# Patient Record
Sex: MALE | Race: BLACK OR AFRICAN AMERICAN | URBAN - METROPOLITAN AREA
[De-identification: names, ages, dates, MRNs, and addresses within clinical notes are randomized per-mention and may not be internally consistent; named-entity substitution may affect disease eponyms.]

---

## 2017-02-03 ENCOUNTER — OFFICE VISIT (OUTPATIENT)
Dept: FAMILY MEDICINE | Facility: CLINIC | Age: 42
End: 2017-02-03

## 2017-02-03 VITALS
BODY MASS INDEX: 25.34 KG/M2 | OXYGEN SATURATION: 100 % | HEART RATE: 86 BPM | RESPIRATION RATE: 18 BRPM | WEIGHT: 167.2 LBS | SYSTOLIC BLOOD PRESSURE: 145 MMHG | TEMPERATURE: 98.2 F | HEIGHT: 68 IN | DIASTOLIC BLOOD PRESSURE: 93 MMHG

## 2017-02-03 DIAGNOSIS — K02.9 DENTAL CARIES: ICD-10-CM

## 2017-02-03 DIAGNOSIS — Z00.00 ENCOUNTER FOR ROUTINE ADULT HEALTH EXAMINATION WITHOUT ABNORMAL FINDINGS: Primary | ICD-10-CM

## 2017-02-03 DIAGNOSIS — F17.200 SMOKER: ICD-10-CM

## 2017-02-03 LAB
CHOLEST SERPL-MCNC: 185.1 MG/DL (ref 0–200)
CHOLEST/HDLC SERPL: 4.2 {RATIO} (ref 0–5)
GLUCOSE CASUAL: 111 MG/DL (ref 51–200)
HDLC SERPL-MCNC: 43.8 MG/DL
LDLC SERPL CALC-MCNC: 121 MG/DL (ref 0–129)
TRIGL SERPL-MCNC: 103 MG/DL (ref 0–150)
VLDL CHOLESTEROL: 20.6 MG/DL (ref 7–32)

## 2017-02-03 NOTE — PROGRESS NOTES
Male Physical Note          HPI         Concerns today: No special concerns today.          History reviewed. No pertinent past medical history.  All - neg  Med Prob - neg  Surg - neg  Fx - neg  Hospitalizations - neg       Family History   Problem Relation Age of Onset     DIABETES No family hx of      Coronary Artery Disease No family hx of      Hypertension No family hx of      Hyperlipidemia No family hx of      Other Cancer No family hx of               Review of Systems:     Review of Systems:  CONSTITUTIONAL: NEGATIVE for fever, chills, change in weight  INTEGUMENTARY/SKIN: NEGATIVE for worrisome rashes, moles or lesions  EYES: NEGATIVE for vision changes or irritation  ENT/MOUTH: NEGATIVE for ear, mouth and throat problems  RESP: NEGATIVE for significant cough or SOB  BREAST: NEGATIVE for masses, tenderness or discharge  CV: NEGATIVE for chest pain, palpitations or peripheral edema  GI: NEGATIVE for nausea, abdominal pain, heartburn, or change in bowel habits  : NEGATIVE for frequency, dysuria, or hematuria  MUSCULOSKELETAL: NEGATIVE for significant arthralgias or myalgia  NEURO: NEGATIVE for weakness, dizziness or paresthesias  ENDOCRINE: NEGATIVE for temperature intolerance, skin/hair changes  HEME/ALLERGY: NEGATIVE for bleeding problems  PSYCHIATRIC: NEGATIVE for changes in mood or affect  Sleep:   Do you snore most or the night (as reported by a family member)? No  Do you feel sleepy or extremely tired during most of the day? No             Social History     Social History     Social History     Marital Status: Single     Spouse Name: N/A     Number of Children: N/A     Years of Education: N/A     Occupational History     Not on file.     Social History Main Topics     Smoking status: Light Tobacco Smoker     Types: Cigarettes     Smokeless tobacco: Not on file     Alcohol Use: No     Drug Use: No     Sexual Activity: Not Currently     Other Topics Concern     Not on file     Social History  "Narrative       Marital Status:Single  Who lives in your household? alone    Has anyone hurt you physically, for example by pushing, hitting, slapping or kicking you or forcing you to have sex? Denies  Do you feel threatened or controlled by a partner, ex-partner or anyone in your life? Denies    Sexual Health     Sexual concerns: No   STI History: Neg      Recommended Screening     Cholesterol Level (>44 yo or at risk):  done  Immunization - recent injects with [employer] evaluation             Physical Exam:     Vitals: /93 mmHg  Pulse 86  Temp(Src) 98.2  F (36.8  C) (Oral)  Resp 18  Ht 5' 8.4\" (173.7 cm)  Wt 167 lb 3.2 oz (75.841 kg)  BMI 25.14 kg/m2  SpO2 100%  BMI= Body mass index is 25.14 kg/(m^2).  GENERAL: healthy, alert and no distress  EYES: Eyes grossly normal to inspection, extraocular movements - intact, and PERRL  HENT: ear canals- normal; TMs- normal; Nose- normal; Mouth- no ulcers, no lesions  NECK: no tenderness, no adenopathy, no asymmetry, no masses, no stiffness; thyroid- normal to palpation  RESP: lungs clear to auscultation - no rales, no rhonchi, no wheezes  BREAST: no masses, no tenderness, no nipple discharge, no palpable axillary masses or adenopathy  CV: regular rates and rhythm, normal S1 S2, no S3 or S4 and no murmur, no click or rub -  ABDOMEN: soft, no tenderness, no  hepatosplenomegaly, no masses, normal bowel sounds  MS: extremities- no gross deformities noted, no edema  SKIN: no suspicious lesions, no rashes  NEURO: strength and tone- normal, sensory exam- grossly normal, mentation- intact, speech- normal, reflexes- symmetric  BACK: no CVA tenderness, no paralumbar tenderness  - male: testicles- normal, no atrophy, no masses;  no inguinal hernias  PSYCH: Alert and oriented times 3; speech- coherent , normal rate and volume; able to articulate logical thoughts, able to abstract reason, no tangential thoughts, no hallucinations or delusions, affect- normal  LYMPHATICS: " ant. cervical- normal, post. cervical- normal, axillary- normal, supraclavicular- normal, inguinal- normal      LAB  Coffee and water this AM    Results for orders placed or performed in visit on 02/03/17   Lipid Cascade ([facility])   Result Value Ref Range    Cholesterol 185.1 0.0 - 200.0 mg/dL    Cholesterol/HDL Ratio 4.2 0.0 - 5.0    HDL Cholesterol 43.8 >40.0 mg/dL    LDL Cholesterol Calculated 121 0 - 129 mg/dL    Triglycerides 103.0 0.0 - 150.0 mg/dL    VLDL Cholesterol 20.6 7.0 - 32.0 mg/dL   Glucose Casual ([facility])   Result Value Ref Range    Glucose Casual 111.0 51.0 - 200.0 mg/dL         Assessment and Plan     1. Encounter for routine adult health examination without abnormal findings  Blood work  send the result in the mail    - Lipid Cascade ([facility])  - Glucose Casual ([facility])    2. Smoker  Discussed cessation    3. Dental caries  Make arrangement for cleanings    4. Elevated BP  Recheck next year        Options for treatment and follow-up care were reviewed with the patient. Fady Shannon and/or guardian engaged in the decision making process and verbalized understanding of the options discussed and agreed with the final plan.    Yefri Cabral MD

## 2017-02-03 NOTE — PATIENT INSTRUCTIONS
1. Encounter for routine adult health examination without abnormal findings  Blood work  I will send the result in the mail    - Lipid Cascade ([facility])  - Glucose Casual ([facility])    2. Smoker  Time to stop    3. Dental caries  Make arrangement for your cleanings    PHarper              Preventive Health Recommendations  Male Ages 40 to 49    Yearly exam:             See your health care provider every year in order to  o   Review health changes.   o   Discuss preventive care.    o   Review your medicines if your doctor has prescribed any.    You should be tested each year for STDs (sexually transmitted diseases) if you re at risk.     Have a cholesterol test every 5 years.     Have a colonoscopy (test for colon cancer) if someone in your family has had colon cancer or polyps before age 50.     After age 45, have a diabetes test (fasting glucose). If you are at risk for diabetes, you should have this test every 3 years.      Talk with your health care provider about whether or not a prostate cancer screening test (PSA) is right for you.    Shots: Get a flu shot each year. Get a tetanus shot every 10 years.     Nutrition:    Eat at least 5 servings of fruits and vegetables daily.     Eat whole-grain bread, whole-wheat pasta and brown rice instead of white grains and rice.     Talk to your provider about Calcium and Vitamin D.     Lifestyle    Exercise for at least 150 minutes a week (30 minutes a day, 5 days a week). This will help you control your weight and prevent disease.     Limit alcohol to one drink per day.     No smoking.     Wear sunscreen to prevent skin cancer.     See your dentist every six months for an exam and cleaning.

## 2017-02-03 NOTE — MR AVS SNAPSHOT
After Visit Summary   2/3/2017    Fady Shannon    MRN: [id number]           Patient Information     Date Of Birth          1975        Visit Information        Provider Department      2/3/2017 9:00 AM Yefri Cabral MD [facility]        Today's Diagnoses     Encounter for routine adult health examination without abnormal findings    -  1     Smoker         Dental caries           Care Instructions    1. Encounter for routine adult health examination without abnormal findings  Blood work  I will send the result in the mail    - Lipid Cascade ([facility])  - Glucose Casual ([facility])    2. Smoker  Time to stop    3. Dental caries  Make arrangement for your cleanings    PHarper              Preventive Health Recommendations  Male Ages 40 to 49    Yearly exam:             See your health care provider every year in order to  o   Review health changes.   o   Discuss preventive care.    o   Review your medicines if your doctor has prescribed any.    You should be tested each year for STDs (sexually transmitted diseases) if you re at risk.     Have a cholesterol test every 5 years.     Have a colonoscopy (test for colon cancer) if someone in your family has had colon cancer or polyps before age 50.     After age 45, have a diabetes test (fasting glucose). If you are at risk for diabetes, you should have this test every 3 years.      Talk with your health care provider about whether or not a prostate cancer screening test (PSA) is right for you.    Shots: Get a flu shot each year. Get a tetanus shot every 10 years.     Nutrition:    Eat at least 5 servings of fruits and vegetables daily.     Eat whole-grain bread, whole-wheat pasta and brown rice instead of white grains and rice.     Talk to your provider about Calcium and Vitamin D.     Lifestyle    Exercise for at least 150 minutes a week (30 minutes a day, 5 days a week). This will help you control your weight and prevent  "disease.     Limit alcohol to one drink per day.     No smoking.     Wear sunscreen to prevent skin cancer.     See your dentist every six months for an exam and cleaning.            Follow-ups after your visit        Who to contact     Please call your clinic at [phone] to:    Ask questions about your health    Make or cancel appointments    Discuss your medicines    Learn about your test results    Speak to your doctor   If you have compliments or concerns about an experience at your clinic, or if you wish to file a complaint, please contact [facility] Patient Relations at [phone] or email us at [email or web address]         Additional Information About Your Visit        ActivityHeroharFlipKey Information     [employer] is an electronic gateway that provides easy, online access to your medical records. With [employer], you can request a clinic appointment, read your test results, renew a prescription or communicate with your care team.     To sign up for [employer] visit the website at [email or web address]   You will be asked to enter the access code listed below, as well as some personal information. Please follow the directions to create your username and password.     Your access code is: [email or web address]  Expires: 2017  9:53 AM     Your access code will  in 90 days. If you need help or a new code, please contact your [facility] Clinic or call [phone] for assistance.        Care EveryWhere ID     This is your Care EveryWhere ID. This could be used by other organizations to access your [facility] medical records  [id number]        Your Vitals Were     Pulse Temperature Respirations Height BMI (Body Mass Index) Pulse Oximetry    86 98.2  F (36.8  C) (Oral) 18 5' 8.4\" (173.7 cm) 25.14 kg/m2 100%       Blood Pressure from Last 3 Encounters:   17 145/93   12/13/15 136/84    Weight from Last 3 Encounters:   17 167 lb 3.2 oz (75.841 kg)    "           We Performed the Following     Glucose Casual ([facility])     Lipid Cascade ([facility])        Primary Care Provider    Physician No Ref-Primary       No address on file        Thank you!     Thank you for choosing [facility]  for your care. Our goal is always to provide you with excellent care. Hearing back from our patients is one way we can continue to improve our services. Please take a few minutes to complete the written survey that you may receive in the mail after your visit with us. Thank you!             Your Updated Medication List - Protect others around you: Learn how to safely use, store and throw away your medicines at www.disposemymeds.org.      Notice  As of 2/3/2017  9:55 AM    You have not been prescribed any medications.

## 2017-02-03 NOTE — Clinical Note
February 3, 2017      Fady Shannon  [address]    [place] [address]        Dear Fady,    Thank you for getting your care at [facility]. Please see below for your test results.  All your labs are normal  Good news    Resulted Orders   Lipid Cascade ([facility])   Result Value Ref Range    Cholesterol 185.1 0.0 - 200.0 mg/dL    Cholesterol/HDL Ratio 4.2 0.0 - 5.0    HDL Cholesterol 43.8 >40.0 mg/dL    LDL Cholesterol Calculated 121 0 - 129 mg/dL    Triglycerides 103.0 0.0 - 150.0 mg/dL    VLDL Cholesterol 20.6 7.0 - 32.0 mg/dL   Glucose Casual ([facility])   Result Value Ref Range    Glucose Casual 111.0 51.0 - 200.0 mg/dL     Sincerely,    Yefri Cabral MD

## 2017-02-13 ENCOUNTER — TELEPHONE (OUTPATIENT)
Dept: FAMILY MEDICINE | Facility: CLINIC | Age: 42
End: 2017-02-13

## 2017-02-13 NOTE — TELEPHONE ENCOUNTER
[facility] phone call message- patient requesting results:    Test: Lab    Date of test: 02/03/17    Additional Comments: I relayed the message regarding his lab results in the letter. However, patient would like his letter resent as he did not receive it. I did confirm patient's address in chart and updated as necessary.     OK to leave a message on voice mail? Yes    Primary language: English      needed? No    Call taken on February 13, 2017 at 11:10 AM by Avelina Arenas

## 2017-04-11 ENCOUNTER — APPOINTMENT (OUTPATIENT)
Dept: GENERAL RADIOLOGY | Facility: CLINIC | Age: 42
End: 2017-04-11
Attending: EMERGENCY MEDICINE
Payer: COMMERCIAL

## 2017-04-11 ENCOUNTER — HOSPITAL ENCOUNTER (EMERGENCY)
Facility: CLINIC | Age: 42
Discharge: HOME | End: 2017-04-11
Attending: EMERGENCY MEDICINE | Admitting: EMERGENCY MEDICINE
Payer: COMMERCIAL

## 2017-04-11 VITALS
TEMPERATURE: 98.5 F | WEIGHT: 164.6 LBS | DIASTOLIC BLOOD PRESSURE: 92 MMHG | SYSTOLIC BLOOD PRESSURE: 136 MMHG | RESPIRATION RATE: 16 BRPM | HEART RATE: 67 BPM | BODY MASS INDEX: 24.74 KG/M2 | OXYGEN SATURATION: 98 %

## 2017-04-11 DIAGNOSIS — S60.131A CONTUSION OF RIGHT MIDDLE FINGER WITH DAMAGE TO NAIL, INITIAL ENCOUNTER: ICD-10-CM

## 2017-04-11 DIAGNOSIS — S60.10XA HEMATOMA, SUBUNGUAL, FINGER, RIGHT, INITIAL ENCOUNTER: ICD-10-CM
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
## 2017-04-11 PROCEDURE — 73140 X-RAY EXAM OF FINGER(S): CPT | Mod: RT

## 2017-04-11 PROCEDURE — 25000132 ZZH RX MED GY IP 250 OP 250 PS 637: Performed by: EMERGENCY MEDICINE

## 2017-04-11 PROCEDURE — 11740 EVACUATION SUBUNGUAL HMTMA: CPT | Mod: F7 | Performed by: EMERGENCY MEDICINE

## 2017-04-11 PROCEDURE — 99284 EMERGENCY DEPT VISIT MOD MDM: CPT | Mod: 25 | Performed by: EMERGENCY MEDICINE

## 2017-04-11 PROCEDURE — 99283 EMERGENCY DEPT VISIT LOW MDM: CPT | Mod: 25 | Performed by: EMERGENCY MEDICINE

## 2017-04-11 RX ORDER — IBUPROFEN 600 MG/1
600 TABLET, FILM COATED ORAL EVERY 6 HOURS PRN
Qty: 30 TABLET | Refills: 0 | Status: SHIPPED | OUTPATIENT
Start: 2017-04-11

## 2017-04-11 RX ORDER — IBUPROFEN 600 MG/1
600 TABLET, FILM COATED ORAL ONCE
Status: COMPLETED | OUTPATIENT
Start: 2017-04-11 | End: 2017-04-11

## 2017-04-11 RX ADMIN — IBUPROFEN 600 MG: 600 TABLET ORAL at 22:38

## 2017-04-11 ASSESSMENT — ENCOUNTER SYMPTOMS
FEVER: 0
NUMBNESS: 0
NAUSEA: 0
VOMITING: 0
WEAKNESS: 0

## 2017-04-11 NOTE — ED AVS SNAPSHOT
[facility], Emergency Department    [address]    [place] [address]    Phone:  [phone]    Fax:  [phone]                                       Fady Shannon   MRN: [id number]    Department:  [facility], Emergency Department   Date of Visit:  4/11/2017           After Visit Summary Signature Page     I have received my discharge instructions, and my questions have been answered. I have discussed any challenges I see with this plan with the nurse or doctor.    ..........................................................................................................................................  Patient/Patient Representative Signature      ..........................................................................................................................................  Patient Representative Print Name and Relationship to Patient    ..................................................               ................................................  Date                                            Time    ..........................................................................................................................................  Reviewed by Signature/Title    ...................................................              ..............................................  Date                                                            Time

## 2017-04-11 NOTE — ED AVS SNAPSHOT
[facility], Emergency Department    [address]    [place] [address]    Phone:  [phone]    Fax:  [phone]                                       Fady Shannon   MRN: [id number]    Department:  [facility], Emergency Department   Date of Visit:  4/11/2017           Patient Information     Date Of Birth          1975        Your diagnoses for this visit were:     Hematoma, subungual, finger, right, initial encounter        You were seen by Anastacio Frost MD.        Discharge Instructions             Subungual Hematoma  A subungual hematoma is blood under the nail. It occurs when the finger or toe has been hit or crushed. It causes the nail to look blue. Sometimes, the bone under the nail may be fractured.    If the bruise is small and not too painful, it will heal without treatment. If the bruise is large and painful, the blood may need to be drained.  If a large area of the nail is damaged, your doctor may want to remove it. If the nail was not removed, it may become loose or fall off in the next 2 weeks. In almost all cases, the nail will grow back from the area under the cuticle called the matrix. This takes a few weeks to start and is complete in about 4 to 6 months for a fingernail and 12 months for a toenail. If the nail bed or matrix was damaged, the nail may grow back with a rough or irregular shape. Sometimes the nail may not regrow at all.  Home care  The following guidelines will help you care for your wound at home:    Apply an ice pack (ice cubes in a plastic bag, wrapped in a thin towel) for 20 minutes every 1 to 2 hours the first day. Continue this 3 to 4 times a day until the pain and swelling goes away.    If the nail was drained:    Keep the nail covered with a clean adhesive bandage for the next 2 days. There may be some oozing of blood during that time, so change the bandage as needed.    Soak the finger or toe once a day under warm running water. Clean any crust away  with a cotton-tipped applicator soaked in soapy water.    If the nail was removed:    The nail bed (tissue under the nail) is moist, soft and sensitive. This needs to be protected from injury for the first 7 to 10 days until it dries out and becomes hard. Keep it covered with a dressing or adhesive bandage until that time.    Bandages tend to stick to a newly exposed nail bed and can be hard to remove if left in place more than 24 hours. Therefore, unless you were told otherwise, change dressings every 24 hours. You can also use nonstick dressings. If necessary, soak the dressing off while holding your finger or toe under warm running water.    If an X-ray showed a fracture, you should protect the finger or toe for 3 to 4 weeks while it is healing.  Here is some information regarding medicine and your wound:    You can take acetaminophen or ibuprofen for pain, unless you were given a different pain medicine to use. If you have chronic liver or kidney disease or ever had a stomach ulcer or GI bleeding or are taking blood thinner medications, talk with your doctor before using these medicines.    If you were given antibiotics, take them until they are used up. It is important to finish the antibiotics even if the wound looks better. This will ensure the infection has cleared.  Follow-up care  Follow up with your doctor or this facility as advised. If the nail was drained, there is a small risk of infection. Watch carefully for the signs listed below.  When to get medical care  Get prompt medical attention if any of the following occur:    Increasing redness around the nail    Increasing local pain or swelling    Pus draining from the nail    Fever of 100.4 F (38 C) or higher, or as directed by your health care provider    9710-2801 [employer]. [address] [place] [address]. All rights reserved. This information is not intended as a substitute for professional medical care. Always follow your  "healthcare professional's instructions.      Protect finger nail from further injury.  Take ibuprofen as needed for pain.    Please make an appointment to follow up with Your Primary Care Provider in 1 week as needed.               24 Hour Appointment Hotline       To make an appointment at any [facility] clinic, call [phone] ([phone]). If you don't have a family doctor or clinic, we will help you find one. [place] [facility] clinics are conveniently located to serve the needs of you and your family.             Review of your medicines      START taking        Dose / Directions Last dose taken    ibuprofen 600 MG tablet   Commonly known as:  ADVIL/MOTRIN   Dose:  600 mg   Quantity:  30 tablet        Take 1 tablet (600 mg) by mouth every 6 hours as needed for moderate pain   Refills:  0                Prescriptions were sent or printed at these locations (1 Prescription)                   Other Prescriptions                Printed at Department/Unit printer (1 of 1)         ibuprofen (ADVIL/MOTRIN) 600 MG tablet                Procedures and tests performed during your visit     Fingers XR, 2-3 views, right      Orders Needing Specimen Collection     None      Pending Results     No orders found from 4/9/2017 to 4/12/2017.            Pending Culture Results     No orders found from 4/9/2017 to 4/12/2017.            Thank you for choosing [facility]       Thank you for choosing [facility] for your care. Our goal is always to provide you with excellent care. Hearing back from our patients is one way we can continue to improve our services. Please take a few minutes to complete the written survey that you may receive in the mail after you visit with us. Thank you!        ID8-Mobile[employer] Information     Vidly lets you send messages to your doctor, view your test results, renew your prescriptions, schedule appointments and more. To sign up, go to [email or web address] . Click on \"Log in\" on the left side of the screen, which " "will take you to the Welcome page. Then click on \"Sign up Now\" on the right side of the page.     You will be asked to enter the access code listed below, as well as some personal information. Please follow the directions to create your username and password.     Your access code is: [email or web address]  Expires: 2017 10:53 AM     Your access code will  in 90 days. If you need help or a new code, please call your [facility] or [phone].        Care EveryWhere ID     This is your Care EveryWhere ID. This could be used by other organizations to access your [place] medical records  [id number]        After Visit Summary       This is your record. Keep this with you and show to your community pharmacist(s) and doctor(s) at your next visit.                  "

## 2017-04-12 NOTE — DISCHARGE INSTRUCTIONS
Subungual Hematoma  A subungual hematoma is blood under the nail. It occurs when the finger or toe has been hit or crushed. It causes the nail to look blue. Sometimes, the bone under the nail may be fractured.    If the bruise is small and not too painful, it will heal without treatment. If the bruise is large and painful, the blood may need to be drained.  If a large area of the nail is damaged, your doctor may want to remove it. If the nail was not removed, it may become loose or fall off in the next 2 weeks. In almost all cases, the nail will grow back from the area under the cuticle called the matrix. This takes a few weeks to start and is complete in about 4 to 6 months for a fingernail and 12 months for a toenail. If the nail bed or matrix was damaged, the nail may grow back with a rough or irregular shape. Sometimes the nail may not regrow at all.  Home care  The following guidelines will help you care for your wound at home:    Apply an ice pack (ice cubes in a plastic bag, wrapped in a thin towel) for 20 minutes every 1 to 2 hours the first day. Continue this 3 to 4 times a day until the pain and swelling goes away.    If the nail was drained:    Keep the nail covered with a clean adhesive bandage for the next 2 days. There may be some oozing of blood during that time, so change the bandage as needed.    Soak the finger or toe once a day under warm running water. Clean any crust away with a cotton-tipped applicator soaked in soapy water.    If the nail was removed:    The nail bed (tissue under the nail) is moist, soft and sensitive. This needs to be protected from injury for the first 7 to 10 days until it dries out and becomes hard. Keep it covered with a dressing or adhesive bandage until that time.    Bandages tend to stick to a newly exposed nail bed and can be hard to remove if left in place more than 24 hours. Therefore, unless you were told otherwise, change dressings every 24 hours. You can  also use nonstick dressings. If necessary, soak the dressing off while holding your finger or toe under warm running water.    If an X-ray showed a fracture, you should protect the finger or toe for 3 to 4 weeks while it is healing.  Here is some information regarding medicine and your wound:    You can take acetaminophen or ibuprofen for pain, unless you were given a different pain medicine to use. If you have chronic liver or kidney disease or ever had a stomach ulcer or GI bleeding or are taking blood thinner medications, talk with your doctor before using these medicines.    If you were given antibiotics, take them until they are used up. It is important to finish the antibiotics even if the wound looks better. This will ensure the infection has cleared.  Follow-up care  Follow up with your doctor or this facility as advised. If the nail was drained, there is a small risk of infection. Watch carefully for the signs listed below.  When to get medical care  Get prompt medical attention if any of the following occur:    Increasing redness around the nail    Increasing local pain or swelling    Pus draining from the nail    Fever of 100.4 F (38 C) or higher, or as directed by your health care provider    9295-0917 [employer]. [address] [place] [address]. All rights reserved. This information is not intended as a substitute for professional medical care. Always follow your healthcare professional's instructions.      Protect finger nail from further injury.  Take ibuprofen as needed for pain.    Please make an appointment to follow up with Your Primary Care Provider in 1 week as needed.

## 2017-04-12 NOTE — ED PROVIDER NOTES
History     Chief Complaint   Patient presents with     Hand Pain     shut car door on right middle finger today at 1300; some bruising noted at base of nailbed and swelling in middle finger; iced finger but hasn't taken meds for pain     HPI  Fady Shannon is a 41 year old right-hand-dominant male who presents to the emergency department with right middle finger pain and swelling.  Patient states that he shut the car door on his middle finger approximately 8 hours prior to presentation in the emergency department.  He complains of pain and bruising underneath his nail bed.  He denies any laceration.  He denies injury elsewhere on the hand.  His tetanus immunization is up-to-date.    I have reviewed the Medications, Allergies, Past Medical and Surgical History, and Social History in the Epic system.    Review of Systems   Constitutional: Negative for fever.   Gastrointestinal: Negative for nausea and vomiting.   Musculoskeletal:        Right middle finger pain   Neurological: Negative for weakness and numbness.       Physical Exam   BP: (!) 136/92  Pulse: 67  Temp: 98.5  F (36.9  C)  Resp: 16  Weight: 74.7 kg (164 lb 9.6 oz)  SpO2: 98 %  Physical Exam   Constitutional: He appears well-developed and well-nourished.   Cardiovascular: Intact distal pulses.    Musculoskeletal: Normal range of motion.        Right hand: He exhibits normal range of motion, normal capillary refill and no deformity. Normal sensation noted. Normal strength noted.        Hands:  Neurological: He is alert. He has normal strength. No sensory deficit.   Skin: Skin is warm and dry.   Nursing note and vitals reviewed.      ED Course     ED Course     Procedures            Critical Care time:    Results for orders placed or performed during the hospital encounter of 04/11/17 (from the past 24 hour(s))   Fingers XR, 2-3 views, right    Narrative    FINGER(S) TWO-THREE VIEWS RIGHT  4/11/2017 9:53 PM     HISTORY: Pain, trauma.    COMPARISON:  None.    FINDINGS: There is no significant degenerative change. There is no  acute fracture or dislocation. There are no worrisome bony lesions.      Impression    IMPRESSION:  No acute osseous abnormality demonstrated.    PRACHI ROBBINS MD      Medications   ibuprofen (ADVIL/MOTRIN) tablet 600 mg (600 mg Oral Given 4/11/17 7848)      Informed consent.  Trephination of subungual hematoma performed by myself with cautery.  Large amount of dark red blood obtained.  Drainage subsequently stopped.  Symptoms relieved.    Assessments & Plan (with Medical Decision Making)   41 year old right-hand-dominant male with right 3rd finger sublingual hematoma after slamming his finger in a car door several hours prior to presentation in the emergency department.  The patient does not have a fracture on radiograph.  His subungual hematoma was trephinated with return of blood and relief of his symptoms.  Ibuprofen prescribed for pain.  Ice recommended.  Primary care follow-up in one week as needed.    I have reviewed the nursing notes.    I have reviewed the findings, diagnosis, plan and need for follow up with the patient.    Discharge Medication List as of 4/11/2017 10:46 PM      START taking these medications    Details   ibuprofen (ADVIL/MOTRIN) 600 MG tablet Take 1 tablet (600 mg) by mouth every 6 hours as needed for moderate pain, Disp-30 tablet, R-0, Local Print             Final diagnoses:   Hematoma, subungual, finger, right, initial encounter       4/11/2017   [facility], [place], EMERGENCY DEPARTMENT     Anastacio Frost MD  04/12/17 0002

## 2017-05-26 ENCOUNTER — OFFICE VISIT (OUTPATIENT)
Dept: FAMILY MEDICINE | Facility: CLINIC | Age: 42
End: 2017-05-26

## 2017-05-26 VITALS
DIASTOLIC BLOOD PRESSURE: 93 MMHG | TEMPERATURE: 98.6 F | HEART RATE: 85 BPM | BODY MASS INDEX: 24.98 KG/M2 | OXYGEN SATURATION: 100 % | RESPIRATION RATE: 20 BRPM | SYSTOLIC BLOOD PRESSURE: 143 MMHG | WEIGHT: 166.2 LBS

## 2017-05-26 DIAGNOSIS — L98.9 FACIAL SKIN LESION: Primary | ICD-10-CM

## 2017-05-26 NOTE — PROGRESS NOTES
"HPI  41 year old male here for evaluation \"acne\"    Longstanding lesions under both eyes  Now darkening  No other locations  No treatments in the past  Not itchy, painful         ROS  6 point ROS neg other than the symptoms noted above in the HPI.    MEDS  Current Outpatient Prescriptions   Medication     ibuprofen (ADVIL/MOTRIN) 600 MG tablet     No current facility-administered medications for this visit.          SOC      FHx  Family History   Problem Relation Age of Onset     DIABETES No family hx of      Coronary Artery Disease No family hx of      Hypertension No family hx of      Hyperlipidemia No family hx of      Other Cancer No family hx of        PHYSICAL EXAMINATION:  Vital signs: BP (!) 143/93  Pulse 85  Temp 98.6  F (37  C) (Oral)  Resp 20  Wt 166 lb 3.2 oz (75.4 kg)  SpO2 100%  BMI 24.98 kg/m2    GENERAL:: healthy, alert and no distress  SKIN: multiple flat well-circumscribed lesions under both eyes - 20-30 under each eye      LABS        ASSESSMENT/PLAN    1. Facial skin lesion  ?Sebaceous Hyperplasia    - DERMATOLOGY REFERRAL - INTERNAL    EMILY GRAY    "

## 2017-05-26 NOTE — PATIENT INSTRUCTIONS
Here is the plan from today's visit    1. Facial skin lesion  Expect a call to schedule an appontment with the skin specialist (dermatologist)    - DERMATOLOGY REFERRAL - INTERNAL    PHarper      Lab Testing:  **If you had lab testing today and your results are reassuring or normal they will be mailed to you or sent through Timeliner within 7 days.   **If the lab tests need quick action we will call you with the results.  The phone number we will call with results is # [phone] (home) . If this is not the best number please call our clinic and change the number.  Medication Refills:  If you need any refills please call your pharmacy and they will contact us.   If you need to  your refill at a new pharmacy, please contact the new pharmacy directly. The new pharmacy will help you get your medications transferred faster.   Scheduling:  If you have any concerns about today's visit or wish to schedule another appointment please call our office during normal business hours [phone] (8-5:00 M-F)  If a referral was made to a [facility] and you don't get a call from central scheduling please call [phone].  If a Mammogram was ordered for you at [facility] call [phone] to schedule or change your appointment.  If you had an XRay/CT/Ultrasound/MRI ordered the number is [phone] to schedule or change your radiology appointment.   Medical Concerns:  If you have urgent medical concerns please call [phone] at any time of the day.

## 2017-05-26 NOTE — MR AVS SNAPSHOT
After Visit Summary   5/26/2017    Fady Shannon    MRN: [id number]           Patient Information     Date Of Birth          1975        Visit Information        Provider Department      5/26/2017 10:00 AM Yefri Cabral MD [facility]        Today's Diagnoses     Facial skin lesion    -  1      Care Instructions    Here is the plan from today's visit    1. Facial skin lesion  Expect a call to schedule an appontment with the skin specialist (dermatologist)    - DERMATOLOGY REFERRAL - INTERNAL    PHarper      Lab Testing:  **If you had lab testing today and your results are reassuring or normal they will be mailed to you or sent through [email or web address] within 7 days.   **If the lab tests need quick action we will call you with the results.  The phone number we will call with results is # [phone] (home) . If this is not the best number please call our clinic and change the number.  Medication Refills:  If you need any refills please call your pharmacy and they will contact us.   If you need to  your refill at a new pharmacy, please contact the new pharmacy directly. The new pharmacy will help you get your medications transferred faster.   Scheduling:  If you have any concerns about today's visit or wish to schedule another appointment please call our office during normal business hours [phone] (8-5:00 M-F)  If a referral was made to a [facility] and you don't get a call from central scheduling please call [phone].  If a Mammogram was ordered for you at [facility] call [phone] to schedule or change your appointment.  If you had an XRay/CT/Ultrasound/MRI ordered the number is [phone] to schedule or change your radiology appointment.   Medical Concerns:  If you have urgent medical concerns please call [phone] at any time of the day.            Follow-ups after your visit        Additional Services     DERMATOLOGY  REFERRAL - INTERNAL       Your provider has referred you to: [facility]: Dermatology Clinic - [place] [phone]   [email or web address]    Please be aware that coverage of these services is subject to the terms and limitations of your health insurance plan.  Call member services at your health plan with any benefit or coverage questions.      Please bring the following with you to your appointment:    (1) Any X-Rays, CTs or MRIs which have been performed.  Contact the facility where they were done to arrange for  prior to your scheduled appointment.    (2) List of current medications  (3) This referral request   (4) Any documents/labs given to you for this referral                  Who to contact     Please call your clinic at [phone] to:    Ask questions about your health    Make or cancel appointments    Discuss your medicines    Learn about your test results    Speak to your doctor   If you have compliments or concerns about an experience at your clinic, or if you wish to file a complaint, please contact [facility] Patient Relations at [phone] or email us at [email or web address]         Additional Information About Your Visit        [employer] Information     [employer] is an electronic gateway that provides easy, online access to your medical records. With [employer], you can request a clinic appointment, read your test results, renew a prescription or communicate with your care team.     To sign up for [employer] visit the website at [email or web address]   You will be asked to enter the access code listed below, as well as some personal information. Please follow the directions to create your username and password.     Your access code is: [email or web address]  Expires: 2017 11:06 AM     Your access code will  in 90 days. If you need help or a new code, please contact your [facility] Clinic or call  [phone] for assistance.        Care EveryWhere ID     This is your Care EveryWhere ID. This could be used by other organizations to access your [place] medical records  [id number]        Your Vitals Were     Pulse Temperature Respirations Pulse Oximetry BMI (Body Mass Index)       85 98.6  F (37  C) (Oral) 20 100% 24.98 kg/m2        Blood Pressure from Last 3 Encounters:   05/26/17 (!) 143/93   04/11/17 (!) 136/92   02/03/17 (!) 145/93    Weight from Last 3 Encounters:   05/26/17 166 lb 3.2 oz (75.4 kg)   04/11/17 164 lb 9.6 oz (74.7 kg)   02/03/17 167 lb 3.2 oz (75.8 kg)              We Performed the Following     DERMATOLOGY REFERRAL - INTERNAL        Primary Care Provider Office Phone # Fax #    Yefri Cabral [phone]       [facility] [address] [address]        Thank you!     Thank you for choosing \[facility]\"" FAMILY MEDICINE [facility]  for your care. Our goal is always to provide you with excellent care. Hearing back from our patients is one way we can continue to improve our services. Please take a few minutes to complete the written survey that you may receive in the mail after your visit with us. Thank you!             Your Updated Medication List - Protect others around you: Learn how to safely use, store and throw away your medicines at www.disposemymeds.org.          This list is accurate as of: 5/26/17 11:06 AM.  Always use your most recent med list.                   Brand Name Dispense Instructions for use    ibuprofen 600 MG tablet    ADVIL/MOTRIN    30 tablet    Take 1 tablet (600 mg) by mouth every 6 hours as needed for moderate pain

## 2017-08-08 ENCOUNTER — OFFICE VISIT (OUTPATIENT)
Dept: DERMATOLOGY | Facility: CLINIC | Age: 42
End: 2017-08-08

## 2017-08-08 ENCOUNTER — TELEPHONE (OUTPATIENT)
Dept: DERMATOLOGY | Facility: CLINIC | Age: 42
End: 2017-08-08

## 2017-08-08 DIAGNOSIS — D23.9 SYRINGOMA: Primary | ICD-10-CM

## 2017-08-08 RX ORDER — TRETINOIN 0.25 MG/G
CREAM TOPICAL
Qty: 45 G | Refills: 3 | Status: SHIPPED | OUTPATIENT
Start: 2017-08-08 | End: 2017-08-08

## 2017-08-08 RX ORDER — TRETINOIN 0.25 MG/G
CREAM TOPICAL
Qty: 45 G | Refills: 3 | Status: SHIPPED | OUTPATIENT
Start: 2017-08-08

## 2017-08-08 NOTE — LETTER
8/8/2017       RE: Fady Shannon  [address]    [place] [address]     Dear Colleague,    Thank you for referring your patient, Fady Shannon, to the [facility] at [facility]. Please see a copy of my visit note below.    [facility] Dermatology Note      Dermatology Problem List:  1.Facial lesions     Encounter Date: Aug 8, 2017    CC:  Chief Complaint   Patient presents with     Derm Problem     Fady is here with concerns of lesions on his face, notes they have not changed and he has had them several years.         History of Present Illness:  Mr. Fady Shannon is a 41 year old male who presents in consultation from [facility]. He is her due to concerns for lesions on his face that he has had since 2002. They popped up and have not changed since then. They have not been painful, itched and generally don't bother him. He feels well and has been a pretty healthy lauren.      Past Medical History:   Patient Active Problem List   Diagnosis     Encounter for routine adult health examination without abnormal findings     Smoker     Dental caries     No past medical history on file.  No past surgical history on file.      Medications:  Current Outpatient Prescriptions   Medication Sig Dispense Refill     ibuprofen (ADVIL/MOTRIN) 600 MG tablet Take 1 tablet (600 mg) by mouth every 6 hours as needed for moderate pain 30 tablet 0     No Known Allergies      Review of Systems:  -Constitutional: The patient denies fatigue, fevers, chills, unintended weight loss, and night sweats.  -HEENT: Patient denies nonhealing oral sores.  -Skin: As above in HPI. No additional skin concerns.    Physical exam:  Vitals: There were no vitals taken for this visit.  GEN: This is a well developed, well-nourished male in no acute distress, in a pleasant mood.    SKIN: Focused examination of the face, neck and bilat UE was performed.  -Many clustered  periorbital hyperpigmented 2-5mm papules bilaterally  -No other lesions of concern on areas examined.     Impression/Plan:  1. 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 The benign nature was discussed. Patient requested some treatment options to get rid of his lesions. Options were discussed  Including topical tx or possibly ablative laser and patient decided to try tretinoin cream first to see if this shows any improvement.      Tretinoin 0.025% cream on face at night before bed    Can discuss additional mgmt options such as laser (? Possibly isotretinoin) at followup     CC Dr. Yefri Cabral on close of this encounter.  Follow-up in 3 months, earlier for new or changing lesions.     Staff Involved:  Scribed by Keith Louis, MS4 for Dr. Hirsch.      The medical student acted as a scribe with respect to this patient.  I have performed all the key elements of the history and physical.    Chele Hirsch MD  Dermatology Attending                      Again, thank you for allowing me to participate in the care of your patient.      Sincerely,    Chele Hirsch MD

## 2017-08-08 NOTE — MR AVS SNAPSHOT
After Visit Summary   2017    Fady Shannon    MRN: [id number]           Patient Information     Date Of Birth          1975        Visit Information        Provider Department      2017 11:30 AM Chele Hirsch MD [facility] Dermatology        Today's Diagnoses     Syringoma    -  1       Follow-ups after your visit        Your next 10 appointments already scheduled     2017 12:45 PM CST   (Arrive by 12:30 PM)   Return Visit with MD JOSE LUIS Mcpherson [facility] Dermatology ([facility] [place])    [address] [place] [address] [phone]              Who to contact     Please call your clinic at [phone] to:    Ask questions about your health    Make or cancel appointments    Discuss your medicines    Learn about your test results    Speak to your doctor   If you have compliments or concerns about an experience at your clinic, or if you wish to file a complaint, please contact [facility] Patient Relations at [phone] or email us at [email or web address]         Additional Information About Your Visit        MyChart Information     [employer] is an electronic gateway that provides easy, online access to your medical records. With [employer], you can request a clinic appointment, read your test results, renew a prescription or communicate with your care team.     To sign up for [employer] visit the website at [email or web address]   You will be asked to enter the access code listed below, as well as some personal information. Please follow the directions to create your username and password.     Your access code is: [email or web address]  Expires: 2017 11:06 AM     Your access code will  in 90 days. If you need help or a new code, please contact your [facility] Clinic or call [phone] for assistance.        Care EveryWhere ID     This is your Care EveryWhere ID.  This could be used by other organizations to access your [place] medical records  [id number]         Blood Pressure from Last 3 Encounters:   05/26/17 (!) 143/93   04/11/17 (!) 136/92   02/03/17 (!) 145/93    Weight from Last 3 Encounters:   05/26/17 75.4 kg (166 lb 3.2 oz)   04/11/17 74.7 kg (164 lb 9.6 oz)   02/03/17 75.8 kg (167 lb 3.2 oz)              Today, you had the following     No orders found for display         Today's Medication Changes          These changes are accurate as of: 8/8/17  1:04 PM.  If you have any questions, ask your nurse or doctor.               Start taking these medicines.        Dose/Directions    tretinoin 0.025 % cream   Commonly known as:  RETIN-A   Used for:  Syringoma        Use every night - pea sized amount to cheeks   Quantity:  45 g   Refills:  3            Where to get your medicines      Some of these will need a paper prescription and others can be bought over the counter.  Ask your nurse if you have questions.     Bring a paper prescription for each of these medications     tretinoin 0.025 % cream                Primary Care Provider Office Phone # Fax #    Yefri Cabral [phone]       [facility] [address] [address]        Equal Access to Services     [address] AH: Hadii laura Acevedo, waaxda luqadaha, qaybta kaalmada adeegyada, codie peace. So [facility] [phone].    ATENCIÓN: Si habla español, tiene a gutierrez disposición servicios gratuitos de asistencia lingüística. Llame al [phone].    We comply with applicable federal civil rights laws and [place] laws. We do not discriminate on the basis of race, color, national origin, age, disability sex, sexual orientation or gender identity.            Thank you!     Thank you for choosing [facility] DERMATOLOGY  for your care. Our goal is always to provide you with excellent care. Hearing back from our patients is one way we can  continue to improve our services. Please take a few minutes to complete the written survey that you may receive in the mail after your visit with us. Thank you!             Your Updated Medication List - Protect others around you: Learn how to safely use, store and throw away your medicines at www.disposemymeds.org.          This list is accurate as of: 8/8/17  1:04 PM.  Always use your most recent med list.                   Brand Name Dispense Instructions for use Diagnosis    ibuprofen 600 MG tablet    ADVIL/MOTRIN    30 tablet    Take 1 tablet (600 mg) by mouth every 6 hours as needed for moderate pain        tretinoin 0.025 % cream    RETIN-A    45 g    Use every night - pea sized amount to cheeks    Syringoma

## 2017-08-08 NOTE — PROGRESS NOTES
[facility] Dermatology Note      Dermatology Problem List:  1.Facial lesions     Encounter Date: Aug 8, 2017    CC:  Chief Complaint   Patient presents with     Derm Problem     Fady is here with concerns of lesions on his face, notes they have not changed and he has had them several years.         History of Present Illness:  Mr. Fady Shannon is a 41 year old male who presents in consultation from [facility]. He is her due to concerns for lesions on his face that he has had since 2002. They popped up and have not changed since then. They have not been painful, itched and generally don't bother him. He feels well and has been a pretty healthy lauren.      Past Medical History:   Patient Active Problem List   Diagnosis     Encounter for routine adult health examination without abnormal findings     Smoker     Dental caries     No past medical history on file.  No past surgical history on file.      Medications:  Current Outpatient Prescriptions   Medication Sig Dispense Refill     ibuprofen (ADVIL/MOTRIN) 600 MG tablet Take 1 tablet (600 mg) by mouth every 6 hours as needed for moderate pain 30 tablet 0     No Known Allergies      Review of Systems:  -Constitutional: The patient denies fatigue, fevers, chills, unintended weight loss, and night sweats.  -HEENT: Patient denies nonhealing oral sores.  -Skin: As above in HPI. No additional skin concerns.    Physical exam:  Vitals: There were no vitals taken for this visit.  GEN: This is a well developed, well-nourished male in no acute distress, in a pleasant mood.    SKIN: Focused examination of the face, neck and bilat UE was performed.  -Many clustered periorbital hyperpigmented 2-5mm papules bilaterally  -No other lesions of concern on areas examined.     Impression/Plan:  1. 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 The benign nature was discussed. Patient  requested some treatment options to get rid of his lesions. Options were discussed  Including topical tx or possibly ablative laser and patient decided to try tretinoin cream first to see if this shows any improvement.      Tretinoin 0.025% cream on face at night before bed    Can discuss additional mgmt options such as laser (? Possibly isotretinoin) at followup     CC Dr. Yefri Cabral on close of this encounter.  Follow-up in 3 months, earlier for new or changing lesions.     Staff Involved:  Scribed by Keith Louis, MS4 for Dr. Hirsch.      The medical student acted as a scribe with respect to this patient.  I have performed all the key elements of the history and physical.    Chele Hirsch MD  Dermatology Attending

## 2017-08-08 NOTE — NURSING NOTE
Dermatology Rooming Note    Fady Shannon's goals for this visit include:   Chief Complaint   Patient presents with     Derm Problem     Fady is here with concerns of lesions on his face, notes they have not changed and he has had them several years.     Deborah Figueroa LPN

## 2017-08-09 NOTE — TELEPHONE ENCOUNTER
PA Initiation    Medication: tretinoin (Retin-A) 0.025% cream  Insurance Company: [employer]AP - Phone [phone] Fax [phone]  Pharmacy Filling the Rx: [facility] [address] [place] [address] AT [facility] [address]  Filling Pharmacy Phone: [phone]  Filling Pharmacy Fax: [phone]  Start Date: 8/9/2017

## 2017-08-10 NOTE — TELEPHONE ENCOUNTER
Prior Authorization Approval    Authorization Effective Date: 8/9/2017  Authorization Expiration Date: 8/9/2018  Medication: tretinoin (Retin-A) 0.025% cream - approved  Approved Dose/Quantity:   Reference #: [id number]   Insurance Company: [employer] Plus PMAP - Phone [phone] Fax [phone]  Expected CoPay: $1.00     CoPay Card Available:      Foundation Assistance Needed:    Which Pharmacy is filling the prescription (Not needed for infusion/clinic administered): [facility] [address] [address] AT [address]  Pharmacy Notified: Yes  Patient Notified: Yes

## 2017-08-30 PROBLEM — D23.9 SYRINGOMA: Status: ACTIVE | Noted: 2017-08-30

## 2017-12-06 ENCOUNTER — OFFICE VISIT (OUTPATIENT)
Dept: FAMILY MEDICINE | Facility: CLINIC | Age: 42
End: 2017-12-06

## 2017-12-06 VITALS
SYSTOLIC BLOOD PRESSURE: 140 MMHG | BODY MASS INDEX: 25.04 KG/M2 | HEART RATE: 101 BPM | WEIGHT: 166.6 LBS | TEMPERATURE: 97.9 F | DIASTOLIC BLOOD PRESSURE: 96 MMHG | OXYGEN SATURATION: 100 %

## 2017-12-06 DIAGNOSIS — R36.9 PENILE DISCHARGE: Primary | ICD-10-CM

## 2017-12-06 RX ORDER — AZITHROMYCIN 500 MG/1
1000 TABLET, FILM COATED ORAL ONCE
Qty: 2 TABLET | Refills: 0 | Status: SHIPPED | OUTPATIENT
Start: 2017-12-06 | End: 2017-12-06

## 2017-12-06 RX ORDER — METRONIDAZOLE 500 MG/1
2000 TABLET ORAL ONCE
Qty: 4 TABLET | Refills: 0 | Status: SHIPPED | OUTPATIENT
Start: 2017-12-06 | End: 2017-12-06

## 2017-12-06 RX ORDER — CEFTRIAXONE SODIUM 250 MG/1
250 INJECTION, POWDER, FOR SOLUTION INTRAMUSCULAR; INTRAVENOUS ONCE
Qty: 2.5 ML | Refills: 0 | OUTPATIENT
Start: 2017-12-06 | End: 2017-12-06

## 2017-12-06 NOTE — NURSING NOTE
I administered the following to Fady Shannon.    MEDICATION: Rocephin 250mLmg and Lidocaine 0.9cc  ROUTE: IM  SITE: RUQ - Gluteus  DOSE: 250mL  LOT #: [id number]  :  Hosp[employer]  EXPIRATION DATE:  06/01/2019  NDC#: 4143-6691-18     Was entire vial of medication used? No, The remainder 0.1mL of 1ML was discarded as unavoidable waste-lidocaine    Name of provider who requested the injection: Avery Mckeon MD  Name of provider on site (faculty or community preceptor) at the time of performing the injection: MD Greg López, [facility]

## 2017-12-06 NOTE — PROGRESS NOTES
HPI:       Fady Shannon is a 42 year old who presents for the following    Genitourinary - Male  Onset: 1 week ago    Description:   Dysuria (painful urination): no  Hematuria (blood in urine):no  Frequency: no  How many times are you getting up to urinate at night? : none  Hesitancy (delay in starting urine):no  Retention (unable to empty): no  Decrease in urinary flow: no  Incontinence: no    Progression of Symptoms:  same    Accompanying Signs & Symptoms:  Fever: no  Back/Flank pain:no  Urethral discharge:Yes Details:  White/yellow  Testicle lumps/masses/pain: no  Nausea and/or vomiting: no  Abdominal pain:no    History:   History of frequent UTI's: no  History of kidney stones: no  History of hernias: no  Personal or Family history of Prostate problems:no  Sexually active:Yes Details:  Last exposure 1 week prior to symptoms      What makes it worse?:   nothing    What makes it better?:  nothing  Therapies Tried and outcome: none      Problem, Medication and Allergy Lists were reviewed and are current..    Patient is an established patient of this clinic.         Physical Exam:   Vitals:    12/06/17 1403 12/06/17 1405   BP: 145/88 (!) 140/96   Pulse: 101    Temp: 97.9  F (36.6  C)    TempSrc: Oral    SpO2: 100%    Weight: 166 lb 9.6 oz (75.6 kg)      Body mass index is 25.04 kg/(m^2).  Vital signs normal except elevated BP  GENERAL: healthy, alert and no distress  ABDOMEN: soft, no tenderness, no  hepatosplenomegaly, no masses, normal bowel sounds  - male: testicles normal without atrophy or masses, no hernias and urethral discharge present  PSYCH:  Anxious       Results:     Pending     Assessment and Plan     1. Penile discharge  With concern for STI.  Discussed options with patient.  He elects treatment while awaiting lab results  - NEISSERIA GONORRHOEA PCR  - CHLAMYDIA TRACHOMATIS PCR  - metroNIDAZOLE (FLAGYL) 500 MG tablet; Take 4 tablets (2,000 mg) by mouth once for 1 dose  Dispense: 4 tablet;  Refill: 0  - azithromycin (ZITHROMAX) 500 MG tablet; Take 2 tablets (1,000 mg) by mouth once for 1 dose  Dispense: 2 tablet; Refill: 0  - INJECTION INTRAMUSCULAR OR SUB-Q  - cefTRIAXone (ROCEPHIN) 250 mg vial, IM (Charge)    Options for treatment and follow-up care were reviewed with the patient. Fady Shannon  engaged in the decision making process and verbalized understanding of the options discussed and agreed with the final plan.    Avery Mckeon MD

## 2017-12-06 NOTE — PATIENT INSTRUCTIONS
Here is the plan from today's visit    1. Penile discharge  - NEISSERIA GONORRHOEA PCR  - CHLAMYDIA TRACHOMATIS PCR  - metroNIDAZOLE (FLAGYL) 500 MG tablet; Take 4 tablets (2,000 mg) by mouth once for 1 dose  Dispense: 4 tablet; Refill: 0  - azithromycin (ZITHROMAX) 500 MG tablet; Take 2 tablets (1,000 mg) by mouth once for 1 dose  Dispense: 2 tablet; Refill: 0    Please call or return to clinic if your symptoms don't go away.    Follow up plan  I will notify you of your results.    Thank you for coming to [facility] today.  Lab Testing:  **If you had lab testing today and your results are reassuring or normal they will be mailed to you or sent through A[employer]-Fi within 7 days.   **If the lab tests need quick action we will call you with the results.  The phone number we will call with results is # [phone] (home) . If this is not the best number please call our clinic and change the number.  Medication Refills:  If you need any refills please call your pharmacy and they will contact us.   If you need to  your refill at a new pharmacy, please contact the new pharmacy directly. The new pharmacy will help you get your medications transferred faster.   Scheduling:  If you have any concerns about today's visit or wish to schedule another appointment please call our office during normal business hours [phone] (8-5:00 M-F)  If a referral was made to a [facility] and you don't get a call from central scheduling please call [phone].  If a Mammogram was ordered for you at [facility] call [phone] to schedule or change your appointment.  If you had an XRay/CT/Ultrasound/MRI ordered the number is [phone] to schedule or change your radiology appointment.   Medical Concerns:  If you have urgent medical concerns please call [phone] at any time of the day.

## 2017-12-06 NOTE — MR AVS SNAPSHOT
After Visit Summary   12/6/2017    Fady Shannon    MRN: [id number]           Patient Information     Date Of Birth          1975        Visit Information        Provider Department      12/6/2017 2:00 PM Avery Mckeon MD [facility]        Today's Diagnoses     Penile discharge    -  1      Care Instructions    Here is the plan from today's visit    1. Penile discharge  - NEISSERIA GONORRHOEA PCR  - CHLAMYDIA TRACHOMATIS PCR  - metroNIDAZOLE (FLAGYL) 500 MG tablet; Take 4 tablets (2,000 mg) by mouth once for 1 dose  Dispense: 4 tablet; Refill: 0  - azithromycin (ZITHROMAX) 500 MG tablet; Take 2 tablets (1,000 mg) by mouth once for 1 dose  Dispense: 2 tablet; Refill: 0    Please call or return to clinic if your symptoms don't go away.    Follow up plan  I will notify you of your results.    Thank you for coming to [facility] today.  Lab Testing:  **If you had lab testing today and your results are reassuring or normal they will be mailed to you or sent through Ac[employer] within 7 days.   **If the lab tests need quick action we will call you with the results.  The phone number we will call with results is # [phone] (home) . If this is not the best number please call our clinic and change the number.  Medication Refills:  If you need any refills please call your pharmacy and they will contact us.   If you need to  your refill at a new pharmacy, please contact the new pharmacy directly. The new pharmacy will help you get your medications transferred faster.   Scheduling:  If you have any concerns about today's visit or wish to schedule another appointment please call our office during normal business hours [phone] (8-5:00 M-F)  If a referral was made to a [facility] and you don't get a call from central scheduling please call [phone].  If a Mammogram was ordered for you at [facility] call [phone] to schedule or  change your appointment.  If you had an XRay/CT/Ultrasound/MRI ordered the number is [phone] to schedule or change your radiology appointment.   Medical Concerns:  If you have urgent medical concerns please call [phone] at any time of the day.            Follow-ups after your visit        Who to contact     Please call your clinic at [phone] to:    Ask questions about your health    Make or cancel appointments    Discuss your medicines    Learn about your test results    Speak to your doctor   If you have compliments or concerns about an experience at your clinic, or if you wish to file a complaint, please contact [facility] Patient Relations at [phone] or email us at [email or web address]         Additional Information About Your Visit        [email or web address] Information     [employer] is an electronic gateway that provides easy, online access to your medical records. With [employer], you can request a clinic appointment, read your test results, renew a prescription or communicate with your care team.     To sign up for [employer] visit the website at [email or web address]   You will be asked to enter the access code listed below, as well as some personal information. Please follow the directions to create your username and password.     Your access code is: [email or web address]  Expires: 2018  5:30 AM     Your access code will  in 90 days. If you need help or a new code, please contact your [facility] or call [phone] for assistance.        Care EveryWhere ID     This is your Care EveryWhere ID. This could be used by other organizations to access your [facility] medical records  [id number]        Your Vitals Were     Pulse Temperature Pulse Oximetry BMI (Body Mass Index)          101 97.9  F (36.6  C) (Oral) 100% 25.04 kg/m2         Blood Pressure from Last 3 Encounters:   17 (!) 140/96   17 (!) 143/93   17 (!)  136/92    Weight from Last 3 Encounters:   12/06/17 166 lb 9.6 oz (75.6 kg)   05/26/17 166 lb 3.2 oz (75.4 kg)   04/11/17 164 lb 9.6 oz (74.7 kg)              We Performed the Following     CHLAMYDIA TRACHOMATIS PCR     NEISSERIA GONORRHOEA PCR          Today's Medication Changes          These changes are accurate as of: 12/6/17  2:32 PM.  If you have any questions, ask your nurse or doctor.               Start taking these medicines.        Dose/Directions    azithromycin 500 MG tablet   Commonly known as:  ZITHROMAX   Used for:  Penile discharge   Started by:  Avery Mckeon MD        Dose:  1000 mg   Take 2 tablets (1,000 mg) by mouth once for 1 dose   Quantity:  2 tablet   Refills:  0       metroNIDAZOLE 500 MG tablet   Commonly known as:  FLAGYL   Used for:  Penile discharge   Started by:  Avery Mckeon MD        Dose:  2000 mg   Take 4 tablets (2,000 mg) by mouth once for 1 dose   Quantity:  4 tablet   Refills:  0            Where to get your medicines      These medications were sent to [facility] [address] [place] [address] AT [address] [address]     Phone:  [phone]     azithromycin 500 MG tablet    metroNIDAZOLE 500 MG tablet                Primary Care Provider Office Phone # Fax #    Yefri Cabral [phone]       [address] [address]        Equal Access to Services     [facility]: Hadii laura bhagat hadasho Sojudyali, waaxda luqadaha, qaybta kaalmada adeegyada, codie lofton . So [facility] [phone].    ATENCIÓN: Si habla español, tiene a gutierrez disposición servicios gratuitos de asistencia lingüística. Llame al [phone].    We comply with applicable federal civil rights laws and [place] laws. We do not discriminate on the basis of race, color, national origin, age, disability, sex, sexual orientation, or gender identity.            Thank you!     Thank you  for choosing [facility]  for your care. Our goal is always to provide you with excellent care. Hearing back from our patients is one way we can continue to improve our services. Please take a few minutes to complete the written survey that you may receive in the mail after your visit with us. Thank you!             Your Updated Medication List - Protect others around you: Learn how to safely use, store and throw away your medicines at www.disposemymeds.org.          This list is accurate as of: 12/6/17  2:32 PM.  Always use your most recent med list.                   Brand Name Dispense Instructions for use Diagnosis    azithromycin 500 MG tablet    ZITHROMAX    2 tablet    Take 2 tablets (1,000 mg) by mouth once for 1 dose    Penile discharge       ibuprofen 600 MG tablet    ADVIL/MOTRIN    30 tablet    Take 1 tablet (600 mg) by mouth every 6 hours as needed for moderate pain        metroNIDAZOLE 500 MG tablet    FLAGYL    4 tablet    Take 4 tablets (2,000 mg) by mouth once for 1 dose    Penile discharge       tretinoin 0.025 % cream    RETIN-A    45 g    Use every night - pea sized amount to cheeks    Syringoma

## 2017-12-07 ENCOUNTER — TELEPHONE (OUTPATIENT)
Dept: FAMILY MEDICINE | Facility: CLINIC | Age: 42
End: 2017-12-07

## 2017-12-07 LAB
C TRACH DNA SPEC QL NAA+PROBE: POSITIVE
N GONORRHOEA DNA SPEC QL NAA+PROBE: NEGATIVE
SPECIMEN SOURCE: ABNORMAL
SPECIMEN SOURCE: NORMAL

## 2017-12-07 NOTE — TELEPHONE ENCOUNTER
RN called patient to inform. Patient reports taking oral antibiotics and will abstain from sexual activity. Patient will inform sexual partners to be tested and treated.    Kaitlin Smith RN

## 2017-12-07 NOTE — TELEPHONE ENCOUNTER
RN:  Please call and inform patient that his chlamydia test was positive.  As long as he took the oral antibiotics that were prescribed yesterday, he has been treated.  He should refrain from all sexual activity for 48 hours and notify all sexual partners that they should be treated.  He does not need to return for retreatment or testing.

## 2017-12-07 NOTE — TELEPHONE ENCOUNTER
[facility] phone call message- general phone call:    Reason for call: Donavon from [facility] lab calling to report significant result: positive chlamydia.    Return call needed: No    OK to leave a message on voice mail? n/a    Primary language: English      needed? No    Call taken on December 7, 2017 at 1:54 PM by Priti De La Torre

## 2018-04-09 ENCOUNTER — RADIANT APPOINTMENT (OUTPATIENT)
Dept: GENERAL RADIOLOGY | Facility: CLINIC | Age: 43
End: 2018-04-09
Attending: FAMILY MEDICINE
Payer: COMMERCIAL

## 2018-04-09 ENCOUNTER — OFFICE VISIT (OUTPATIENT)
Dept: FAMILY MEDICINE | Facility: CLINIC | Age: 43
End: 2018-04-09
Payer: COMMERCIAL

## 2018-04-09 VITALS
OXYGEN SATURATION: 98 % | BODY MASS INDEX: 27.32 KG/M2 | WEIGHT: 181.8 LBS | HEART RATE: 110 BPM | RESPIRATION RATE: 99 BRPM | SYSTOLIC BLOOD PRESSURE: 146 MMHG | TEMPERATURE: 98.9 F | DIASTOLIC BLOOD PRESSURE: 101 MMHG

## 2018-04-09 DIAGNOSIS — Z13.9 SCREENING FOR CONDITION: ICD-10-CM

## 2018-04-09 DIAGNOSIS — Z13.9 SCREENING FOR CONDITION: Primary | ICD-10-CM

## 2018-04-09 DIAGNOSIS — R03.0 ELEVATED BLOOD PRESSURE READING WITHOUT DIAGNOSIS OF HYPERTENSION: ICD-10-CM

## 2018-04-09 DIAGNOSIS — Z22.7 LATENT TUBERCULOSIS: ICD-10-CM

## 2018-04-09 NOTE — MR AVS SNAPSHOT
After Visit Summary   2018    Fady Shannon    MRN: [id number]           Patient Information     Date Of Birth          1975        Visit Information        Provider Department      2018 2:00 PM Erwin Navarro MD [facility]        Today's Diagnoses     Screening for condition    -  1    Latent tuberculosis        Elevated blood pressure reading without diagnosis of hypertension           Follow-ups after your visit        Who to contact     Please call your clinic at [phone] to:    Ask questions about your health    Make or cancel appointments    Discuss your medicines    Learn about your test results    Speak to your doctor            Additional Information About Your Visit        MyChart Information     [employer] is an electronic gateway that provides easy, online access to your medical records. With [employer], you can request a clinic appointment, read your test results, renew a prescription or communicate with your care team.     To sign up for [employer] visit the website at [email or web address]   You will be asked to enter the access code listed below, as well as some personal information. Please follow the directions to create your username and password.     Your access code is: [email or web address]  Expires: 2018  8:38 AM     Your access code will  in 90 days. If you need help or a new code, please contact your [facility] or call [phone] for assistance.        Care EveryWhere ID     This is your Care EveryWhere ID. This could be used by other organizations to access your [facility] medical records  [id number]        Your Vitals Were     Pulse Temperature Respirations Pulse Oximetry BMI (Body Mass Index)       110 98.9  F (37.2  C) (Oral) 99 98% 27.32 kg/m2        Blood Pressure from Last 3 Encounters:   18 (!) 146/101   17 (!) 140/96   17 (!) 143/93    Weight from Last 3 Encounters:   18  181 lb 12.8 oz (82.5 kg)   12/06/17 166 lb 9.6 oz (75.6 kg)   05/26/17 166 lb 3.2 oz (75.4 kg)               Primary Care Provider Office Phone # Fax #    Yefri Cabral [phone]       [address] [address]        Equal Access to Services     [address] : Hadii aad ku hadasho Soomaali, waaxda luqadaha, qaybta kaalmada adeegyada, waxay idiin hayaan adeeg jose mariash la'aan ah. So [facility] [phone].    ATENCIÓN: Si habla karthik, tiene a gutierrez disposición servicios gratuitos de asistencia lingüística. Aimee al [phone].    We comply with applicable federal civil rights laws and [place] laws. We do not discriminate on the basis of race, color, national origin, age, disability, sex, sexual orientation, or gender identity.            Thank you!     Thank you for choosing [facility]  for your care. Our goal is always to provide you with excellent care. Hearing back from our patients is one way we can continue to improve our services. Please take a few minutes to complete the written survey that you may receive in the mail after your visit with us. Thank you!             Your Updated Medication List - Protect others around you: Learn how to safely use, store and throw away your medicines at www.disposemymeds.org.          This list is accurate as of 4/9/18 11:59 PM.  Always use your most recent med list.                   Brand Name Dispense Instructions for use Diagnosis    ibuprofen 600 MG tablet    ADVIL/MOTRIN    30 tablet    Take 1 tablet (600 mg) by mouth every 6 hours as needed for moderate pain        tretinoin 0.025 % cream    RETIN-A    45 g    Use every night - pea sized amount to cheeks    Syringoma

## 2018-04-10 ENCOUNTER — TELEPHONE (OUTPATIENT)
Dept: FAMILY MEDICINE | Facility: CLINIC | Age: 43
End: 2018-04-10

## 2018-04-10 NOTE — TELEPHONE ENCOUNTER
Called patient, per Dr Navarro's request, to let him know that letter is ready to be picked up. Patient advised that he will come into clinic later today to pick it up. Close encounter.

## 2018-04-12 PROBLEM — R03.0 ELEVATED BLOOD PRESSURE READING WITHOUT DIAGNOSIS OF HYPERTENSION: Status: ACTIVE | Noted: 2018-04-12

## 2018-04-12 NOTE — PROGRESS NOTES
SUBJECTIVE:   Fady Shannon is a 42 year old male who presents to clinic today for the following health issues:  1. TB Screen  2. Elevated BP    Patient comes in for TB screen--says he needs a chest x-ray.  Sounds like he has a latent tb history but he is unclear on this.    Patient also states that he has elevated BP intermittently.  Has not checked it regularly and sound like it is high most of the time lately.   Recent cold but denies, significant cough, sob, night sweats, fevers, or weight loss.  Problem list and histories reviewed & adjusted, as indicated.  Additional history: as documented    Patient Active Problem List   Diagnosis     Encounter for routine adult health examination without abnormal findings     Smoker     Dental caries     Syringoma     Elevated blood pressure reading without diagnosis of hypertension     History reviewed. No pertinent surgical history.    Social History   Substance Use Topics     Smoking status: Light Tobacco Smoker     Types: Cigarettes     Smokeless tobacco: Never Used     Alcohol use No     Family History   Problem Relation Age of Onset     DIABETES No family hx of      Coronary Artery Disease No family hx of      Hypertension No family hx of      Hyperlipidemia No family hx of      Other Cancer No family hx of          Current Outpatient Prescriptions   Medication Sig Dispense Refill     tretinoin (RETIN-A) 0.025 % cream Use every night - pea sized amount to cheeks (Patient not taking: Reported on 4/9/2018) 45 g 3     ibuprofen (ADVIL/MOTRIN) 600 MG tablet Take 1 tablet (600 mg) by mouth every 6 hours as needed for moderate pain (Patient not taking: Reported on 12/6/2017) 30 tablet 0     No Known Allergies    Reviewed and updated as needed this visit by clinical staff  Tobacco  Allergies  Meds  Med Hx  Surg Hx  Fam Hx  Soc Hx      Reviewed and updated as needed this visit by Provider         ROS:  Constitutional, HEENT, cardiovascular, pulmonary, gi and gu  systems are negative, except as otherwise noted.    OBJECTIVE:     BP (!) 146/101 (BP Location: Left arm, Patient Position: Chair, Cuff Size: Adult Regular)  Pulse 110  Temp 98.9  F (37.2  C) (Oral)  Resp (!) 99  Wt 181 lb 12.8 oz (82.5 kg)  SpO2 98%  BMI 27.32 kg/m2  Body mass index is 27.32 kg/(m^2).  GENERAL: healthy, alert and no distress  NECK: some soft shoddy fullness below jaw which may be some mild LAD, no asymmetry, masses, or scars and thyroid normal to palpation  RESP: lungs clear to auscultation - no rales, rhonchi or wheezes  CV: regular rate and rhythm, normal S1 S2, no S3 or S4, no murmur, click or rub, no peripheral edema and peripheral pulses strong  ABDOMEN: soft, nontender, no hepatosplenomegaly, no masses and bowel sounds normal  MS: no gross musculoskeletal defects noted, no edema    Diagnostic Test Results:  Results for orders placed or performed in visit on 04/09/18   XR CHEST 1 VW (+PPD OR PREOP)    Narrative    PA chest one view    HISTORY: Screening    COMPARISON STUDY: None.    FINDINGS: Cardiac silhouette is nonenlarged. Lungs clear.    MARGY WATKINS MD       ASSESSMENT/PLAN:       ICD-10-CM    1. Screening for condition Z13.9 XR CHEST 1 VW (+PPD OR PREOP)   2. Latent tuberculosis R76.11    3. Elevated blood pressure reading without diagnosis of hypertension R03.0      We should clarify for sure if this is Latent TB and consider INH if so.  Patient states he will check his BP every few weeks at the pharmacy and will return if elevated.    Erwin Navarro MD  [facility]

## 2018-06-22 ENCOUNTER — RADIANT APPOINTMENT (OUTPATIENT)
Dept: GENERAL RADIOLOGY | Facility: CLINIC | Age: 43
End: 2018-06-22
Attending: FAMILY MEDICINE
Payer: COMMERCIAL

## 2018-06-22 ENCOUNTER — OFFICE VISIT (OUTPATIENT)
Dept: FAMILY MEDICINE | Facility: CLINIC | Age: 43
End: 2018-06-22
Payer: COMMERCIAL

## 2018-06-22 VITALS
SYSTOLIC BLOOD PRESSURE: 132 MMHG | BODY MASS INDEX: 26.3 KG/M2 | HEART RATE: 85 BPM | WEIGHT: 175 LBS | DIASTOLIC BLOOD PRESSURE: 87 MMHG | TEMPERATURE: 99.2 F | OXYGEN SATURATION: 98 %

## 2018-06-22 DIAGNOSIS — S43.002S SUBLUXATION OF LEFT SHOULDER JOINT, SEQUELA: ICD-10-CM

## 2018-06-22 DIAGNOSIS — S43.002S SUBLUXATION OF LEFT SHOULDER JOINT, SEQUELA: Primary | ICD-10-CM

## 2018-06-22 NOTE — PROGRESS NOTES
HPI  42 year old male here for evaluation of L shoulder Pain      1. L shoulder pain / subluxation  Patient injured his left shoulder many years ago when playing soccer.  Since then he has had frequent and recurrent subluxations posteriorly of his left shoulder.  Over the last few weeks he has begun hurting constantly.  This is different than the usual usual pattern.  No radicular symptoms.  No loss of motor function.  No neck injury or neck pain.  He has never done physical therapy.  He has never had an x-ray.  He has not seen a doctor for this.      2. Blood pressure  last visit 146/101   blood pressure today is Normal.    3. Skin  using tretonin x3 mo  Not helping  Only other option - laser. Not going to do this    4. Need INH for LTBI?   Did not discuss      ROS  6 point ROS neg other than the symptoms noted above in the HPI.    MEDS  Current Outpatient Prescriptions   Medication     ibuprofen (ADVIL/MOTRIN) 600 MG tablet     tretinoin (RETIN-A) 0.025 % cream     No current facility-administered medications for this visit.          SOC      FHx  Family History   Problem Relation Age of Onset     Diabetes No family hx of      Coronary Artery Disease No family hx of      Hypertension No family hx of      Hyperlipidemia No family hx of      Other Cancer No family hx of        PHYSICAL EXAMINATION:  Vital signs: /87  Pulse 85  Temp 99.2  F (37.3  C) (Oral)  Wt 175 lb (79.4 kg)  SpO2 98%  BMI 26.3 kg/m2    Gen: no distress  L Shoulder:  Tender posteriorly, FROM, tendon stress - neg, impingment sign- neg        LABS    2 views left shoulder radiographs 6/22/2018 11:55 AM     History: ; Subluxation of left shoulder joint, sequela      Comparison: Radiograph 4/9/2018     Findings:     AP and transscapular Y views of the left shoulder were obtained.      Moderate degenerative change of the glenohumeral joint.      Multiple bony deformities possibly suggestive of prior dislocations,  such as possible small Hill  Sachs's deformity. Additional bony  irregularities involving the superior medial humeral head. Bony  contour alteration and cystic changes involving the posterior inferior  glenoid.     Grossly congruent glenohumeral joint alignment on these two views. The  acromioclavicular joint is congruent. No substantial degenerative  change in acromioclavicular joint.     Soft tissue is unremarkable.  The visualized lung is clear.         Impression: Multiple bony changes possibly reflecting sequela of prior  dislocation as detailed above. Grossly the glenohumeral alignment on  these two views. Though images are suboptimally profiled. If  persistent concern, consider 4 views including axillary view.      ASSESSMENT/PLAN    1. Subluxation of left shoulder joint, sequela  Patient with left shoulder dislocation many years ago with recurrent subluxation since then.  Now with ongoing pain which is different.  X-ray suggestive of bone changes.    Tylenol/ibuprofen.  Heat/ice  Sports medicine referral.   Likely physical therapy referral by sports medicine.  Patient is not interested in a steroid injection.      - XR SHOULDER LT G/E 2 VW; Future  - [facility]      EMILY GRAY

## 2018-06-22 NOTE — MR AVS SNAPSHOT
After Visit Summary   6/22/2018    Fady Shannon    MRN: [id number]           Patient Information     Date Of Birth          1975        Visit Information        Provider Department      6/22/2018 10:40 AM Yefri Cabral MD [facility]        Today's Diagnoses     Subluxation of left shoulder joint, sequela    -  1       Follow-ups after your visit        Additional Services     [facility]       Reason: L shoulder subluxation  Urgency of Appointment: Next Available  Length of Problem: Chronic (3 months or longer since onset): Ordering Provider - please ensure that radiographs or imaging is available within the past 12 months or obtain new imaging if concern for bone or joint.  What are you requesting be done? Management Recommendations                  Your next 10 appointments already scheduled     Jul 10, 2018 10:40 AM CDT   RETURN ORTHO with MD [facility] ([facility])    [address] [place] [address],  [address]  [place] [address]   [phone]              Who to contact     Please call your clinic at [phone] to:    Ask questions about your health    Make or cancel appointments    Discuss your medicines    Learn about your test results    Speak to your doctor            Additional Information About Your Visit        Care EveryWhere ID     This is your Care EveryWhere ID. This could be used by other organizations to access your [place] medical records  [id number]        Your Vitals Were     Pulse Temperature Pulse Oximetry BMI (Body Mass Index)          85 99.2  F (37.3  C) (Oral) 98% 26.3 kg/m2         Blood Pressure from Last 3 Encounters:   06/22/18 132/87   04/09/18 (!) 146/101   12/06/17 (!) 140/96    Weight from Last 3 Encounters:   06/22/18 175 lb (79.4 kg)   04/09/18 181 lb 12.8 oz (82.5 kg)   12/06/17 166 lb 9.6 oz (75.6 kg)              We Performed the  Following     [facility]        Primary Care Provider Office Phone # Fax #    Yefri Cabral [phone]       [address] [address]        Equal Access to Services     [address] : Hadii laura bhagat stacyo Sojudyali, walukasda luqadaha, qaybta kaalmada esa, codie grantin hayaateri willardjf moisemelida peace. So [facility] [phone].    ATENCIÓN: Si habla español, tiene a gutierrez disposición servicios gratuitos de asistencia lingüística. Ll[facility] [phone].    We comply with applicable federal civil rights laws and [place] laws. We do not discriminate on the basis of race, color, national origin, age, disability, sex, sexual orientation, or gender identity.            Thank you!     Thank you for choosing [facility]  for your care. Our goal is always to provide you with excellent care. Hearing back from our patients is one way we can continue to improve our services. Please take a few minutes to complete the written survey that you may receive in the mail after your visit with us. Thank you!             Your Updated Medication List - Protect others around you: Learn how to safely use, store and throw away your medicines at www.disposemymeds.org.          This list is accurate as of 6/22/18 11:59 PM.  Always use your most recent med list.                   Brand Name Dispense Instructions for use Diagnosis    ibuprofen 600 MG tablet    ADVIL/MOTRIN    30 tablet    Take 1 tablet (600 mg) by mouth every 6 hours as needed for moderate pain        tretinoin 0.025 % cream    RETIN-A    45 g    Use every night - pea sized amount to cheeks    Syringoma